# Patient Record
Sex: MALE | Race: WHITE | NOT HISPANIC OR LATINO | Employment: OTHER | URBAN - METROPOLITAN AREA
[De-identification: names, ages, dates, MRNs, and addresses within clinical notes are randomized per-mention and may not be internally consistent; named-entity substitution may affect disease eponyms.]

---

## 2017-04-28 ENCOUNTER — HOSPITAL ENCOUNTER (EMERGENCY)
Facility: HOSPITAL | Age: 52
Discharge: HOME | End: 2017-04-28
Attending: EMERGENCY MEDICINE | Admitting: EMERGENCY MEDICINE
Payer: MEDICARE

## 2017-04-28 VITALS
SYSTOLIC BLOOD PRESSURE: 165 MMHG | OXYGEN SATURATION: 95 % | RESPIRATION RATE: 16 BRPM | HEART RATE: 87 BPM | DIASTOLIC BLOOD PRESSURE: 79 MMHG | WEIGHT: 312 LBS | TEMPERATURE: 100.4 F

## 2017-04-28 DIAGNOSIS — M54.9 BACK PAIN: ICD-10-CM

## 2017-04-28 DIAGNOSIS — R53.83 FATIGUE: Primary | ICD-10-CM

## 2017-04-28 LAB
ALBUMIN SERPL BCP-MCNC: 3.3 G/DL (ref 3.5–5)
ALP SERPL-CCNC: 51 U/L (ref 46–116)
ALT SERPL W P-5'-P-CCNC: 32 U/L (ref 12–78)
ANION GAP SERPL CALCULATED.3IONS-SCNC: 9 MMOL/L (ref 4–13)
AST SERPL W P-5'-P-CCNC: 19 U/L (ref 5–45)
ATRIAL RATE: 101 BPM
BACTERIA UR QL AUTO: ABNORMAL /HPF
BASOPHILS # BLD AUTO: 0.01 THOUSANDS/ΜL (ref 0–0.1)
BASOPHILS NFR BLD AUTO: 0 % (ref 0–1)
BILIRUB SERPL-MCNC: 0.56 MG/DL (ref 0.2–1)
BILIRUB UR QL STRIP: ABNORMAL
BUN SERPL-MCNC: 15 MG/DL (ref 5–25)
CALCIUM SERPL-MCNC: 8.8 MG/DL (ref 8.3–10.1)
CHLORIDE SERPL-SCNC: 100 MMOL/L (ref 100–108)
CLARITY UR: CLEAR
CO2 SERPL-SCNC: 27 MMOL/L (ref 21–32)
COLOR UR: YELLOW
COLOR, POC: YELLOW
CREAT SERPL-MCNC: 1.23 MG/DL (ref 0.6–1.3)
EOSINOPHIL # BLD AUTO: 0.01 THOUSAND/ΜL (ref 0–0.61)
EOSINOPHIL NFR BLD AUTO: 0 % (ref 0–6)
ERYTHROCYTE [DISTWIDTH] IN BLOOD BY AUTOMATED COUNT: 12.5 % (ref 11.6–15.1)
GFR SERPL CREATININE-BSD FRML MDRD: >60 ML/MIN/1.73SQ M
GLUCOSE SERPL-MCNC: 285 MG/DL (ref 65–140)
GLUCOSE UR STRIP-MCNC: ABNORMAL MG/DL
HCT VFR BLD AUTO: 44.5 % (ref 36.5–49.3)
HGB BLD-MCNC: 15.7 G/DL (ref 12–17)
HGB UR QL STRIP.AUTO: ABNORMAL
KETONES UR STRIP-MCNC: ABNORMAL MG/DL
LEUKOCYTE ESTERASE UR QL STRIP: NEGATIVE
LYMPHOCYTES # BLD AUTO: 1.37 THOUSANDS/ΜL (ref 0.6–4.47)
LYMPHOCYTES NFR BLD AUTO: 18 % (ref 14–44)
MCH RBC QN AUTO: 33.1 PG (ref 26.8–34.3)
MCHC RBC AUTO-ENTMCNC: 35.3 G/DL (ref 31.4–37.4)
MCV RBC AUTO: 94 FL (ref 82–98)
MONOCYTES # BLD AUTO: 1.05 THOUSAND/ΜL (ref 0.17–1.22)
MONOCYTES NFR BLD AUTO: 14 % (ref 4–12)
NEUTROPHILS # BLD AUTO: 5.31 THOUSANDS/ΜL (ref 1.85–7.62)
NEUTS SEG NFR BLD AUTO: 68 % (ref 43–75)
NITRITE UR QL STRIP: NEGATIVE
NON-SQ EPI CELLS URNS QL MICRO: ABNORMAL /HPF
NRBC BLD AUTO-RTO: 0 /100 WBCS
P AXIS: 67 DEGREES
PH UR STRIP.AUTO: 5 [PH] (ref 4.5–8)
PLATELET # BLD AUTO: 192 THOUSANDS/UL (ref 149–390)
PMV BLD AUTO: 10.9 FL (ref 8.9–12.7)
POTASSIUM SERPL-SCNC: 3.9 MMOL/L (ref 3.5–5.3)
PR INTERVAL: 158 MS
PROT SERPL-MCNC: 7 G/DL (ref 6.4–8.2)
PROT UR STRIP-MCNC: >=300 MG/DL
QRS AXIS: 59 DEGREES
QRSD INTERVAL: 84 MS
QT INTERVAL: 328 MS
QTC INTERVAL: 425 MS
RBC # BLD AUTO: 4.75 MILLION/UL (ref 3.88–5.62)
RBC #/AREA URNS AUTO: ABNORMAL /HPF
SODIUM SERPL-SCNC: 136 MMOL/L (ref 136–145)
SP GR UR STRIP.AUTO: 1.01 (ref 1–1.03)
T WAVE AXIS: 63 DEGREES
UROBILINOGEN UR QL STRIP.AUTO: 0.2 E.U./DL
VENTRICULAR RATE: 101 BPM
WBC # BLD AUTO: 7.75 THOUSAND/UL (ref 4.31–10.16)
WBC #/AREA URNS AUTO: ABNORMAL /HPF

## 2017-04-28 PROCEDURE — 87086 URINE CULTURE/COLONY COUNT: CPT

## 2017-04-28 PROCEDURE — 36415 COLL VENOUS BLD VENIPUNCTURE: CPT | Performed by: EMERGENCY MEDICINE

## 2017-04-28 PROCEDURE — 81002 URINALYSIS NONAUTO W/O SCOPE: CPT | Performed by: EMERGENCY MEDICINE

## 2017-04-28 PROCEDURE — 80053 COMPREHEN METABOLIC PANEL: CPT | Performed by: EMERGENCY MEDICINE

## 2017-04-28 PROCEDURE — 99284 EMERGENCY DEPT VISIT MOD MDM: CPT

## 2017-04-28 PROCEDURE — 96374 THER/PROPH/DIAG INJ IV PUSH: CPT

## 2017-04-28 PROCEDURE — 85025 COMPLETE CBC W/AUTO DIFF WBC: CPT | Performed by: EMERGENCY MEDICINE

## 2017-04-28 PROCEDURE — 81001 URINALYSIS AUTO W/SCOPE: CPT

## 2017-04-28 PROCEDURE — 96361 HYDRATE IV INFUSION ADD-ON: CPT

## 2017-04-28 PROCEDURE — 93005 ELECTROCARDIOGRAM TRACING: CPT | Performed by: EMERGENCY MEDICINE

## 2017-04-28 RX ORDER — AMLODIPINE BESYLATE 5 MG/1
10 TABLET ORAL ONCE
Status: COMPLETED | OUTPATIENT
Start: 2017-04-28 | End: 2017-04-28

## 2017-04-28 RX ORDER — GABAPENTIN 100 MG/1
200 CAPSULE ORAL ONCE
Status: COMPLETED | OUTPATIENT
Start: 2017-04-28 | End: 2017-04-28

## 2017-04-28 RX ORDER — NAPROXEN 500 MG/1
500 TABLET ORAL 2 TIMES DAILY PRN
Qty: 14 TABLET | Refills: 0 | Status: SHIPPED | OUTPATIENT
Start: 2017-04-28 | End: 2020-02-13 | Stop reason: ALTCHOICE

## 2017-04-28 RX ORDER — GABAPENTIN 100 MG/1
200 CAPSULE ORAL 2 TIMES DAILY
COMMUNITY
End: 2020-02-13 | Stop reason: ALTCHOICE

## 2017-04-28 RX ORDER — KETOROLAC TROMETHAMINE 30 MG/ML
15 INJECTION, SOLUTION INTRAMUSCULAR; INTRAVENOUS ONCE
Status: COMPLETED | OUTPATIENT
Start: 2017-04-28 | End: 2017-04-28

## 2017-04-28 RX ORDER — FUROSEMIDE 40 MG/1
40 TABLET ORAL DAILY
COMMUNITY

## 2017-04-28 RX ORDER — BACLOFEN 10 MG/1
10 TABLET ORAL 3 TIMES DAILY PRN
Qty: 21 TABLET | Refills: 0 | Status: SHIPPED | OUTPATIENT
Start: 2017-04-28 | End: 2017-05-28

## 2017-04-28 RX ORDER — AMLODIPINE BESYLATE 10 MG/1
10 TABLET ORAL DAILY
COMMUNITY

## 2017-04-28 RX ADMIN — GABAPENTIN 200 MG: 100 CAPSULE ORAL at 11:19

## 2017-04-28 RX ADMIN — KETOROLAC TROMETHAMINE 15 MG: 30 INJECTION, SOLUTION INTRAMUSCULAR at 11:01

## 2017-04-28 RX ADMIN — SODIUM CHLORIDE 1000 ML: 0.9 INJECTION, SOLUTION INTRAVENOUS at 10:06

## 2017-04-28 RX ADMIN — AMLODIPINE BESYLATE 10 MG: 5 TABLET ORAL at 11:19

## 2017-04-29 LAB — BACTERIA UR CULT: NORMAL

## 2020-02-13 ENCOUNTER — OFFICE VISIT (OUTPATIENT)
Dept: URGENT CARE | Facility: MEDICAL CENTER | Age: 55
End: 2020-02-13
Payer: MEDICARE

## 2020-02-13 VITALS
HEART RATE: 92 BPM | DIASTOLIC BLOOD PRESSURE: 93 MMHG | RESPIRATION RATE: 20 BRPM | SYSTOLIC BLOOD PRESSURE: 171 MMHG | BODY MASS INDEX: 37.11 KG/M2 | OXYGEN SATURATION: 98 % | TEMPERATURE: 97.8 F | WEIGHT: 280 LBS | HEIGHT: 73 IN

## 2020-02-13 DIAGNOSIS — M25.561 CHRONIC PAIN OF RIGHT KNEE: Primary | ICD-10-CM

## 2020-02-13 DIAGNOSIS — G89.29 CHRONIC PAIN OF RIGHT KNEE: Primary | ICD-10-CM

## 2020-02-13 PROCEDURE — G0463 HOSPITAL OUTPT CLINIC VISIT: HCPCS | Performed by: PHYSICIAN ASSISTANT

## 2020-02-13 PROCEDURE — 99213 OFFICE O/P EST LOW 20 MIN: CPT | Performed by: PHYSICIAN ASSISTANT

## 2020-02-13 RX ORDER — ATORVASTATIN CALCIUM 40 MG/1
40 TABLET, FILM COATED ORAL DAILY
COMMUNITY
Start: 2019-05-06

## 2020-02-13 RX ORDER — INSULIN ASPART 100 [IU]/ML
INJECTION, SOLUTION INTRAVENOUS; SUBCUTANEOUS
COMMUNITY
Start: 2019-05-16

## 2020-02-13 RX ORDER — CYCLOBENZAPRINE HCL 5 MG
5-10 TABLET ORAL 2 TIMES DAILY PRN
COMMUNITY
Start: 2019-12-04

## 2020-02-13 RX ORDER — IBUPROFEN 800 MG/1
800 TABLET ORAL EVERY 8 HOURS PRN
COMMUNITY
Start: 2019-12-19 | End: 2020-12-18

## 2020-02-13 RX ORDER — CLONAZEPAM 1 MG/1
1 TABLET ORAL DAILY PRN
COMMUNITY
Start: 2019-12-13

## 2020-02-13 RX ORDER — CANDESARTAN 16 MG/1
16 TABLET ORAL DAILY
COMMUNITY
Start: 2020-02-06

## 2020-02-13 RX ORDER — FENOFIBRATE 134 MG/1
134 CAPSULE ORAL
COMMUNITY
Start: 2019-05-06

## 2020-02-13 RX ORDER — LEVOTHYROXINE SODIUM 0.05 MG/1
50 TABLET ORAL DAILY
COMMUNITY
Start: 2019-05-06
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
## 2020-02-13 NOTE — PATIENT INSTRUCTIONS
Use knee brace as needed for comfort and support   follow-up with Orthopedics   follow-up with physical therapy  Follow up with PCP in 3-5 days  Proceed to  ER if symptoms worsen  Knee Pain   WHAT YOU NEED TO KNOW:   Knee pain may start suddenly, or it may be a long-term problem  You may have pain on the side, front, or back of your knee  You may have knee stiffness and swelling  You may hear popping sounds or feel like your knee is giving way or locking up as you walk  You may feel pain when you sit, stand, walk, or climb up and down stairs  Knee pain can be caused by conditions such as obesity, inflammation, or strains or tears in ligaments or tendons  DISCHARGE INSTRUCTIONS:   Follow up with your healthcare provider within 24 hours or as directed: You may need follow-up treatments, such as steroid injections to decrease pain  Write down your questions so you remember to ask them during your visits  Self-care:   · Rest  your knee so it can heal  Limit activities that increase your pain  · Ice  can help reduce swelling  Wrap ice in a towel and put it on your knee for as long and as often as directed  · Compression  with a brace or bandage can help reduce swelling  Use a brace or bandage only as directed  · Elevation  helps decrease pain and swelling  Elevate your knee while you are sitting or lying down  Prop your leg on pillows to keep your knee above the level of your heart  Medicines:   · NSAIDs  help decrease swelling and pain or fever  This medicine is available with or without a doctor's order  NSAIDs can cause stomach bleeding or kidney problems in certain people  If you take blood thinner medicine, always ask your healthcare provider if NSAIDs are safe for you  Always read the medicine label and follow directions  · Acetaminophen  decreases pain and fever  It is available without a doctor's order  Ask how much to take and when to take it  Follow directions   Acetaminophen can cause liver damage if not taken correctly  · Take your medicine as directed  Contact your healthcare provider if you think your medicine is not helping or if you have side effects  Tell him or her if you are allergic to any medicine  Keep a list of the medicines, vitamins, and herbs you take  Include the amounts, and when and why you take them  Bring the list or the pill bottles to follow-up visits  Carry your medicine list with you in case of an emergency  Exercise as directed: You may need to see a physical therapist or do recommended exercises to improve movement and decrease your pain  You may be directed to walk, swim, or ride a bike  Follow your exercise plan exactly as directed to avoid further injury  Contact your healthcare provider if:   · You have questions or concerns about your condition or care  Return to the emergency department if:   · Your pain is worse, even after treatment  · You cannot bend or straighten your leg completely  · The swelling around your knee does not go down even with treatment  · Your knee is painful and hot to the touch  © 2017 [employer]  Information is for End User's use only and may not be sold, redistributed or otherwise used for commercial purposes  All illustrations and images included in CareNotes® are the copyrighted property of [employer]  or [employer]  The above information is an  only  It is not intended as medical advice for individual conditions or treatments  Talk to your doctor, nurse or pharmacist before following any medical regimen to see if it is safe and effective for you
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
## 2024-07-23 NOTE — PROGRESS NOTES
NEPHROLOGY OUTPATIENT CONSULTATION   Jim Marcos Jr. 59 y.o. male MRN: [id number]  Date: 7/24/2024  Reason for consultation:   Chief Complaint   Patient presents with    Consult    Proteinuria       ASSESSMENT AND PLAN:  Persistent Proteinuria Chronic kidney disease stage II  -Onset: At least since 2018  -Proteinuria gradually worsening last albumin creatinine ratio from July 18, 2024 showed history of 1.2 g from previous level of 337 mg.  Renal function is stable baseline creatinine 0.9 to 1.2 mg/dL last blood work showed creatinine 1.12 mg/dL with EGFR of 76 making it chronic kidney disease stage II  -Proteinuria Likely due to diabetic nephropathy, due to HTN and due to smoking related glomerulosclerosis.   -office UA positive for trace protein and blood. Urine micro: no RBC   -Last A1c was 7.3 but previously peak proteinuria was 11.9% in April 2022  -on treatment with candesartan to help with proteinuria.  Not able to use SGLT2 inhibitors as previously he had recurrent UTI while he was on Farxiga.  Not able to increase the dose of candesartan at this time as blood pressure is low but may consider increasing to 32 mg daily which is the maximum dose in future if possible.  Another option would be to add spironolactone in future  -stressed on quitting smoking and avoid NSAIDs.  Stressed on daily exercise and weight loss  -Stressed on Goal A1c <7.0 and Goal BP <130/80 mmHg  -Continue to monitor.  Follow-up in 4 months with repeat labs    Primary Hypertension:   -Current medication: Amlodipine 10 mg, candesartan 16 mg daily, metoprolol 25 mg twice daily     -Current blood pressure: low  -Plan:     Decrease amlodipine to 5 mg daily and monitor BP.  Continue same dose of candesartan as he has proteinuria and continue same dose of metoprolol as he has A-fib  -Recommend 2 g sodium diet.    -Recommend daily exercise and weight loss  -Discussed home monitoring of BP and maintaining a log of blood pressure.   -Recommend goal BP less than 130/80.     Vitamin D deficiency  -Vitamin D level 21 in July 2024  -continue vitamin D 1000 units daily    Left lower extremity edema, on Lasix as needed, also on amlodipine which could be contributing to edema.  Decrease the dose of amlodipine today which may help.  Recommend limb elevation and low-sodium diet    Hypernatremia  -recommend increasing fluid intake. Monitor Sodium level was 146     Diabetes Mellitus Type 2 with CKD stage 2:  -recommend goal A1c less than 7.0 if possible  -stressed on diabetic diet and daily exercise as well as weight loss  -continue monitoring of A1c per PCP, management per PCP and follow-up with PCP  -discussed about risk of CKD progression if diabetes is not controlled well.  -currently on: Tresiba  and NovoLog.  Reviewed endocrine note from [facility]  -Last A1c:  7.3     Hyperlipidemia: On Lofibra. Not taking statin-recommend discussion with PCP and monitoring per PCP    Current smoker- 1-2 /day, stressed on quitting smoking     Obesity:  -BMI: Body mass index is 34.9 kg/m².  -Stressed on daily exercise, reduced calorie diet and weight loss.  Discussed about need for lifestyle modification.  Recommend 150 minutes of exercise per week such as brisk walking.      Diagnoses and all orders for this visit:    Persistent proteinuria, unspecified  -     POCT urine dip  -     Basic metabolic panel; Future  -     Protein / creatinine ratio, urine; Future  -     Urinalysis with microscopic; Future  -     Albumin / creatinine urine ratio; Future    Benign hypertension  -     Basic metabolic panel; Future    Chronic kidney disease, stage II (mild)  -     Basic metabolic panel; Future  -     Protein / creatinine ratio, urine; Future  -     Urinalysis with microscopic; Future  -     Albumin / creatinine urine ratio; Future    Type 2 diabetes mellitus with proteinuria  (HCC)  -     Basic metabolic panel; Future  -     Protein / creatinine ratio, urine; Future  -      "Urinalysis with microscopic; Future  -     Albumin / creatinine urine ratio; Future    Vitamin D deficiency    Hypernatremia  -     Basic metabolic panel; Future    Obesity (BMI 30.0-34.9)    Other orders  -     Multiple Vitamin (MULTIVITAMIN ADULT PO); Take by mouth daily  -     Vitamin D, Cholecalciferol, 25 MCG (1000 UT) TABS; Take by mouth  -     apixaban (ELIQUIS) 5 mg; Take 5 mg by mouth 2 (two) times a day        Patient Instructions   Blood pressure is low. Decrease amlodipine to 5 mg daily    -Recommend 2 g sodium diet.    -Recommend daily exercise and weight loss  -Discussed home monitoring of BP and maintaining a log of blood pressure.  -Recommend goal BP less than 130/80. Please inform me if SBP below 110 or above 140's persistently.     Renal function stable     Avoid NSAIDs    Increase oral water intake    Follow up: 4 months with repeat Lab work within a week of the scheduled office visit. Will discuss the results of the previsit Labs during the office visit.      Patient Education     Chronic kidney disease   The Basics   Written by the doctors and editors at [facility]   What is chronic kidney disease? -- Chronic kidney disease, or \"CKD,\" is when the kidneys stop working as well as they should. When they are working normally, the kidneys filter blood and remove waste and excess salt and water (figure 1).  In people with CKD, the kidneys slowly lose the ability to filter blood. In time, the kidneys can stop working completely. That is why it is so important to keep CKD from getting worse.  What are the symptoms of CKD? -- At first, CKD causes no symptoms. As the disease gets worse, it can:   Make your feet, ankles, or legs swell (called \"edema\")   Give you high blood pressure   Make you very tired   Damage your bones  Will I need tests? -- Yes. Your doctor will want to see you regularly. You will probably have appointments at least once a year, and you will get regular tests to check your kidneys. " "These include blood and urine tests.  If your CKD gets worse over time, you will probably need to see a \"nephrologist.\" This is a doctor who takes care of people with kidney disease.  Is there anything I can do to keep my kidneys from getting worse? -- Yes. If you have CKD, you can protect your kidneys if you:   Take all of your prescribed medicines every day, and follow all of your doctor's instructions for how to take them.   Keep your blood sugar in a healthy range, if you have diabetes.   Change your diet, if your doctor or nurse recommends to. They might suggest working with a dietitian (nutrition expert).   Quit smoking, if you smoke.   Lose weight, if you have excess body weight.   Avoid medicines that can harm the kidneys - One example is nonsteroidal antiinflammatory drugs (\"NSAIDs\"). These medicines include ibuprofen (sample brand names: Advil, Motrin) and naproxen (sample brand name: Aleve). There are other medicines that people with CKD need to avoid, too. Check with your doctor, nurse, or kidney specialist before starting any new medicines or supplements, even those you can buy without a prescription.  How is CKD treated? -- People in the early stages of CKD can take medicines to keep the disease from getting worse. For example, many people with CKD should take medicines known as \"ACE inhibitors\" or \"angiotensin receptor blockers.\" If your doctor or nurse prescribes these medicines, it is very important that you take them every day as directed. If they cause side effects or cost too much, tell your doctor or nurse. They might have solutions to offer.  What happens if my kidneys stop working completely? -- If your kidneys can no longer filter blood properly, you can choose between 3 different treatments to take over their job. Your choices are:   Kidney transplant - After transplant surgery, the new kidney can do the job of your own kidneys. If you have a kidney transplant, you will need to take " medicines for the rest of your life to keep your body from reacting badly to the new kidney. (You only need 1 kidney to live.)   Hemodialysis - This is a procedure in which a dialysis machine takes over the job of the kidneys. The machine pumps blood out of the body, filters it, and returns it to the body. If you choose hemodialysis, you will need to be hooked up to the machine at least 3 times a week for several hours for the rest of your life. Before you start, you will also need to have surgery to prepare a blood vessel for attachment to the machine.   Peritoneal dialysis - This involves piping a special fluid into the belly every day. If you choose peritoneal dialysis, you will need surgery to have a tube implanted in your belly. Then, you will have to learn how to pipe the fluid in and out through that tube.  How do I choose between the different treatment options? -- You and your doctor will need to work together to find a treatment that's right for you. Kidney transplant surgery is usually the best option for most people. But often, there are no kidneys available for transplant.  Ask your doctor to explain all of your options and how they might work for you. Then, talk openly with them about how you feel about all of the options. You might even decide that you do not want any treatment. That is your choice.  All topics are updated as new evidence becomes available and our peer review process is complete.  This topic retrieved from [employer] on: May 18, 2024.  Topic 06254 Version 34.0  Release: 32.4.3 - C32.137  © 2024 UpToDate, Inc. and/or its affiliates. All rights reserved.  figure 1: Anatomy of the urinary tract     Urine is made by the kidneys. It passes from the kidneys into the bladder through 2 tubes called the ureters. Then, it leaves the bladder through another tube called the urethra.  Graphic 87528 Version 8.0  Consumer Information Use and Disclaimer   Disclaimer: This generalized information is a  limited summary of diagnosis, treatment, and/or medication information. It is not meant to be comprehensive and should be used as a tool to help the user understand and/or assess potential diagnostic and treatment options. It does NOT include all information about conditions, treatments, medications, side effects, or risks that may apply to a specific patient. It is not intended to be medical advice or a substitute for the medical advice, diagnosis, or treatment of a health care provider based on the health care provider's examination and assessment of a patient's specific and unique circumstances. Patients must speak with a health care provider for complete information about their health, medical questions, and treatment options, including any risks or benefits regarding use of medications. This information does not endorse any treatments or medications as safe, effective, or approved for treating a specific patient. UpToDate, Inc. and its affiliates disclaim any warranty or liability relating to this information or the use thereof.The use of this information is governed by the Terms of Use, available at https://www.Cart[employer].com/en/know/clinical-effectiveness-terms. 2024© UpToDate, Inc. and its affiliates and/or licensors. All rights reserved.  Copyright   © 2024 Up[employer]Date, Inc. and/or its affiliates. All rights reserved.      HISTORY OF PRESENT ILLNESS:  Jim Marcos Jr. is a 59 y.o. year old male with medical issues of diabetes mellitus type 2 since 1995, proliferative retinopathy, HTN ,  paroxysmal A-fib, hypothyroidism, hyperlipidemia who presents for initial consultation for proteinuria    -Patient was hospitalized from June 14 to  June 16 for hypoglycemia incident.  During the hospital discharge he was on metoprolol 25 mg twice daily and he was started on Eliquis as well.  Underwent cardioversion during the hospital stay as per the review of PCP note    -> Patient was seen by endocrine at [facility] on  July 17, 2024.  Was referred for finding of worsening proteinuria on urine test from July 2024.  Albumin creatinine ratio worsened to 1.2 g.  Blood work was reviewed, baseline creatinine appears to be 0.9 to 1.2 mg/dL dating back to 2021.  Renal function was stable last blood work from July 2024 showed creatinine 1.12 mg/dL    - was taken off Farxiga due to recurrent UTI. Took for couple months     Has intermittent left lower extremity edema but denies any pain.  He is on Lasix as needed but has not required recently.  Denies any urinary symptoms, denies any intake of NSAIDs, takes Tylenol if needed    REVIEW OF SYSTEMS:    Review of Systems   Constitutional:  Negative for activity change, appetite change, chills, diaphoresis, fatigue and fever.   HENT:  Negative for congestion, ear pain, facial swelling, nosebleeds and sore throat.    Eyes:  Negative for pain and visual disturbance.   Respiratory:  Negative for cough, chest tightness and shortness of breath.    Cardiovascular:  Negative for chest pain and palpitations.   Gastrointestinal:  Negative for abdominal distention, abdominal pain, diarrhea, nausea and vomiting.   Genitourinary:  Negative for difficulty urinating, dysuria, flank pain, frequency and hematuria.   Musculoskeletal:  Negative for arthralgias, back pain and joint swelling.   Skin:  Negative for color change and rash.   Neurological:  Positive for dizziness. Negative for seizures, syncope, weakness and headaches.   Psychiatric/Behavioral:  Negative for agitation and confusion. The patient is not nervous/anxious.    All other systems reviewed and are negative.      More than 10 point review of systems were obtained and discussed in length with the patient.     PAST MEDICAL HISTORY:  Past Medical History:   Diagnosis Date    Atrial fibrillation (HCC)     Depression     Diabetes mellitus (HCC)     Hypercholesteremia     Hypertension     Hypothyroidism        PAST SURGICAL HISTORY:  Past Surgical  History:   Procedure Laterality Date    EYE SURGERY         ALLERGIES:  Allergies   Allergen Reactions    Nitrofurantoin Shortness Of Breath     Does not remember      Sulfa Antibiotics        SOCIAL HISTORY:  Social History     Substance and Sexual Activity   Alcohol Use No     Social History     Substance and Sexual Activity   Drug Use No     Social History     Tobacco Use   Smoking Status Every Day    Current packs/day: 0.00    Types: Cigarettes    Last attempt to quit: 2017    Years since quittin.2   Smokeless Tobacco Never       FAMILY HISTORY:  Family History   Problem Relation Age of Onset    Diabetes Mother     COPD Mother     Heart disease Father     Diabetes Maternal Uncle        MEDICATIONS:    Current Outpatient Medications:     amLODIPine (NORVASC) 10 mg tablet, Take 10 mg by mouth daily, Disp: , Rfl:     apixaban (ELIQUIS) 5 mg, Take 5 mg by mouth 2 (two) times a day, Disp: , Rfl:     candesartan (ATACAND) 16 mg tablet, Take 16 mg by mouth daily, Disp: , Rfl:     clonazePAM (KlonoPIN) 1 mg tablet, Take 1 mg by mouth daily as needed, Disp: , Rfl:     fenofibrate micronized (LOFIBRA) 134 MG capsule, Take 134 mg by mouth, Disp: , Rfl:     furosemide (LASIX) 40 mg tablet, Take 40 mg by mouth daily as needed, Disp: , Rfl:     Insulin Aspart FlexPen (NOVOLOG FLEXPEN) 100 UNIT/ML SOPN, Inject 3x daily with meals. 1 unit for every 5 grams of CHO. TDD 80 units. E11.65, Disp: , Rfl:     insulin detemir (LEVEMIR FLEXTOUCH) 100 Units/mL injection pen, Inject 40 Units under the skin 2 (two) times a day, Disp: , Rfl:     levothyroxine 50 mcg tablet, Take 50 mcg by mouth daily, Disp: , Rfl:     metoprolol tartrate (LOPRESSOR) 25 mg tablet, Take 25 mg by mouth 2 (two) times a day, Disp: , Rfl:     Multiple Vitamin (MULTIVITAMIN ADULT PO), Take by mouth daily, Disp: , Rfl:     Vitamin D, Cholecalciferol, 25 MCG (1000 UT) TABS, Take by mouth, Disp: , Rfl:     atorvastatin (LIPITOR) 40 mg tablet, Take 40 mg by  "mouth daily (Patient not taking: Reported on 7/24/2024), Disp: , Rfl:     baclofen 10 mg tablet, Take 1 tablet by mouth 3 (three) times a day as needed for muscle spasms for up to 30 days (Patient not taking: Reported on 7/24/2024), Disp: 21 tablet, Rfl: 0    ibuprofen (MOTRIN) 800 mg tablet, Take 800 mg by mouth every 8 (eight) hours as needed (Patient not taking: Reported on 7/24/2024), Disp: , Rfl:       PHYSICAL EXAM:  Vitals:    07/24/24 1117 07/24/24 1222   BP:  96/70   Weight: 123 kg (271 lb 12.8 oz)    Height: 6' 2\" (1.88 m)      Body mass index is 34.9 kg/m².  Wt Readings from Last 3 Encounters:   07/24/24 123 kg (271 lb 12.8 oz)   02/13/20 127 kg (280 lb)   04/28/17 (!) 142 kg (312 lb)     Physical Exam  Constitutional:       General: He is not in acute distress.     Appearance: He is well-developed. He is not diaphoretic.   HENT:      Head: Normocephalic and atraumatic.      Mouth/Throat:      Mouth: Mucous membranes are moist.   Eyes:      General: No scleral icterus.     Conjunctiva/sclera: Conjunctivae normal.      Pupils: Pupils are equal, round, and reactive to light.   Neck:      Thyroid: No thyromegaly.   Cardiovascular:      Rate and Rhythm: Normal rate and regular rhythm.      Heart sounds: Normal heart sounds. No murmur heard.     No friction rub.   Pulmonary:      Effort: Pulmonary effort is normal. No respiratory distress.      Breath sounds: Normal breath sounds. No wheezing or rales.   Abdominal:      General: Bowel sounds are normal. There is no distension.      Palpations: Abdomen is soft.      Tenderness: There is no abdominal tenderness.   Musculoskeletal:         General: No deformity.      Cervical back: Neck supple.      Right lower leg: No edema.      Left lower leg: Edema (trace edema) present.   Lymphadenopathy:      Cervical: No cervical adenopathy.   Skin:     General: Skin is dry.      Coloration: Skin is not jaundiced or pale.      Nails: There is no clubbing.   Neurological: " "     Mental Status: He is alert and oriented to person, place, and time. He is not disoriented.   Psychiatric:         Mood and Affect: Mood normal. Mood is not anxious. Affect is not inappropriate.         Behavior: Behavior normal.         Thought Content: Thought content normal.           Lab Results:   Results for orders placed or performed in visit on 07/24/24   POCT urine dip   Result Value Ref Range    LEUKOCYTE ESTERASE,UA -     NITRITE,UA -     SL AMB POCT UROBILINOGEN 0.2     POCT URINE PROTEIN 30      PH,UA 5.0     BLOOD,UA 50     SPECIFIC GRAVITY,UA 1.015     KETONES,UA -     BILIRUBIN,UA -     GLUCOSE, UA -      COLOR,UA Yellow     CLARITY,UA Clear        Results from last 7 days   Lab Units 07/18/24  0948   SODIUM mmol/L 146*   POTASSIUM mmol/L 4.6   CHLORIDE mmol/L 108   CO2 mmol/L 30   BUN mg/dL 36*   CREATININE mg/dL 1.12   CALCIUM mg/dL 9.3   XMAGNESIUM mg/dL 1.9       Portions of the record may have been created with voice recognition software. Occasional wrong word or \"sound a like\" substitutions may have occurred due to the inherent limitations of voice recognition software. Read the chart carefully and recognize, using context, where substitutions have occurred.    "

## 2024-07-24 ENCOUNTER — OFFICE VISIT (OUTPATIENT)
Dept: NEPHROLOGY | Facility: CLINIC | Age: 59
End: 2024-07-24
Payer: MEDICARE

## 2024-07-24 VITALS
BODY MASS INDEX: 34.88 KG/M2 | DIASTOLIC BLOOD PRESSURE: 70 MMHG | WEIGHT: 271.8 LBS | SYSTOLIC BLOOD PRESSURE: 96 MMHG | HEIGHT: 74 IN

## 2024-07-24 DIAGNOSIS — R80.1 PERSISTENT PROTEINURIA, UNSPECIFIED: Primary | ICD-10-CM

## 2024-07-24 DIAGNOSIS — E87.0 HYPERNATREMIA: ICD-10-CM

## 2024-07-24 DIAGNOSIS — I10 BENIGN HYPERTENSION: ICD-10-CM

## 2024-07-24 DIAGNOSIS — E55.9 VITAMIN D DEFICIENCY: ICD-10-CM

## 2024-07-24 DIAGNOSIS — R80.9 TYPE 2 DIABETES MELLITUS WITH PROTEINURIA  (HCC): ICD-10-CM

## 2024-07-24 DIAGNOSIS — N18.2 CHRONIC KIDNEY DISEASE, STAGE II (MILD): ICD-10-CM

## 2024-07-24 DIAGNOSIS — E11.29 TYPE 2 DIABETES MELLITUS WITH PROTEINURIA  (HCC): ICD-10-CM

## 2024-07-24 DIAGNOSIS — E66.9 OBESITY (BMI 30.0-34.9): ICD-10-CM

## 2024-07-24 PROBLEM — E66.01 MORBID OBESITY (HCC): Status: ACTIVE | Noted: 2024-07-24

## 2024-07-24 LAB
SL AMB  POCT GLUCOSE, UA: ABNORMAL
SL AMB LEUKOCYTE ESTERASE,UA: ABNORMAL
SL AMB POCT BILIRUBIN,UA: ABNORMAL
SL AMB POCT BLOOD,UA: 50
SL AMB POCT CLARITY,UA: CLEAR
SL AMB POCT COLOR,UA: YELLOW
SL AMB POCT KETONES,UA: ABNORMAL
SL AMB POCT NITRITE,UA: ABNORMAL
SL AMB POCT PH,UA: 5
SL AMB POCT SPECIFIC GRAVITY,UA: 1.01
SL AMB POCT URINE PROTEIN: 30
SL AMB POCT UROBILINOGEN: 0.2

## 2024-07-24 PROCEDURE — 99204 OFFICE O/P NEW MOD 45 MIN: CPT | Performed by: INTERNAL MEDICINE

## 2024-07-24 PROCEDURE — 81002 URINALYSIS NONAUTO W/O SCOPE: CPT | Performed by: INTERNAL MEDICINE

## 2024-07-24 RX ORDER — MULTIVIT-MIN/IRON/FOLIC ACID/K 18-600-40
CAPSULE ORAL
COMMUNITY

## 2024-07-24 NOTE — PATIENT INSTRUCTIONS
"Blood pressure is low. Decrease amlodipine to 5 mg daily    -Recommend 2 g sodium diet.    -Recommend daily exercise and weight loss  -Discussed home monitoring of BP and maintaining a log of blood pressure.  -Recommend goal BP less than 130/80. Please inform me if SBP below 110 or above 140's persistently.     Renal function stable     Avoid NSAIDs    Increase oral water intake    Follow up: 4 months with repeat Lab work within a week of the scheduled office visit. Will discuss the results of the previsit Labs during the office visit.      Patient Education     Chronic kidney disease   The Basics   Written by the doctors and editors at [facility]   What is chronic kidney disease? -- Chronic kidney disease, or \"CKD,\" is when the kidneys stop working as well as they should. When they are working normally, the kidneys filter blood and remove waste and excess salt and water (figure 1).  In people with CKD, the kidneys slowly lose the ability to filter blood. In time, the kidneys can stop working completely. That is why it is so important to keep CKD from getting worse.  What are the symptoms of CKD? -- At first, CKD causes no symptoms. As the disease gets worse, it can:   Make your feet, ankles, or legs swell (called \"edema\")   Give you high blood pressure   Make you very tired   Damage your bones  Will I need tests? -- Yes. Your doctor will want to see you regularly. You will probably have appointments at least once a year, and you will get regular tests to check your kidneys. These include blood and urine tests.  If your CKD gets worse over time, you will probably need to see a \"nephrologist.\" This is a doctor who takes care of people with kidney disease.  Is there anything I can do to keep my kidneys from getting worse? -- Yes. If you have CKD, you can protect your kidneys if you:   Take all of your prescribed medicines every day, and follow all of your doctor's instructions for how to take them.   Keep your blood " "sugar in a healthy range, if you have diabetes.   Change your diet, if your doctor or nurse recommends to. They might suggest working with a dietitian (nutrition expert).   Quit smoking, if you smoke.   Lose weight, if you have excess body weight.   Avoid medicines that can harm the kidneys - One example is nonsteroidal antiinflammatory drugs (\"NSAIDs\"). These medicines include ibuprofen (sample brand names: Advil, Motrin) and naproxen (sample brand name: Aleve). There are other medicines that people with CKD need to avoid, too. Check with your doctor, nurse, or kidney specialist before starting any new medicines or supplements, even those you can buy without a prescription.  How is CKD treated? -- People in the early stages of CKD can take medicines to keep the disease from getting worse. For example, many people with CKD should take medicines known as \"ACE inhibitors\" or \"angiotensin receptor blockers.\" If your doctor or nurse prescribes these medicines, it is very important that you take them every day as directed. If they cause side effects or cost too much, tell your doctor or nurse. They might have solutions to offer.  What happens if my kidneys stop working completely? -- If your kidneys can no longer filter blood properly, you can choose between 3 different treatments to take over their job. Your choices are:   Kidney transplant - After transplant surgery, the new kidney can do the job of your own kidneys. If you have a kidney transplant, you will need to take medicines for the rest of your life to keep your body from reacting badly to the new kidney. (You only need 1 kidney to live.)   Hemodialysis - This is a procedure in which a dialysis machine takes over the job of the kidneys. The machine pumps blood out of the body, filters it, and returns it to the body. If you choose hemodialysis, you will need to be hooked up to the machine at least 3 times a week for several hours for the rest of your life. Before " you start, you will also need to have surgery to prepare a blood vessel for attachment to the machine.   Peritoneal dialysis - This involves piping a special fluid into the belly every day. If you choose peritoneal dialysis, you will need surgery to have a tube implanted in your belly. Then, you will have to learn how to pipe the fluid in and out through that tube.  How do I choose between the different treatment options? -- You and your doctor will need to work together to find a treatment that's right for you. Kidney transplant surgery is usually the best option for most people. But often, there are no kidneys available for transplant.  Ask your doctor to explain all of your options and how they might work for you. Then, talk openly with them about how you feel about all of the options. You might even decide that you do not want any treatment. That is your choice.  All topics are updated as new evidence becomes available and our peer review process is complete.  This topic retrieved from [employer] on: May 18, 2024.  Topic 97002 Version 34.0  Release: 32.4.3 - C32.137  © 2024 UpToDate, Inc. and/or its affiliates. All rights reserved.  figure 1: Anatomy of the urinary tract     Urine is made by the kidneys. It passes from the kidneys into the bladder through 2 tubes called the ureters. Then, it leaves the bladder through another tube called the urethra.  Graphic 09821 Version 8.0  Consumer Information Use and Disclaimer   Disclaimer: This generalized information is a limited summary of diagnosis, treatment, and/or medication information. It is not meant to be comprehensive and should be used as a tool to help the user understand and/or assess potential diagnostic and treatment options. It does NOT include all information about conditions, treatments, medications, side effects, or risks that may apply to a specific patient. It is not intended to be medical advice or a substitute for the medical advice, diagnosis, or  treatment of a health care provider based on the health care provider's examination and assessment of a patient's specific and unique circumstances. Patients must speak with a health care provider for complete information about their health, medical questions, and treatment options, including any risks or benefits regarding use of medications. This information does not endorse any treatments or medications as safe, effective, or approved for treating a specific patient. UpToDate, Inc. and its affiliates disclaim any warranty or liability relating to this information or the use thereof.The use of this information is governed by the Terms of Use, available at https://www.JETMEuwer.com/en/know/clinical-effectiveness-terms. 2024© UpToDate, Inc. and its affiliates and/or licensors. All rights reserved.  Copyright   © 2024 UpToDate, Inc. and/or its affiliates. All rights reserved.

## 2024-12-04 ENCOUNTER — TELEPHONE (OUTPATIENT)
Dept: NEPHROLOGY | Facility: CLINIC | Age: 59
End: 2024-12-04

## 2024-12-06 ENCOUNTER — TELEPHONE (OUTPATIENT)
Dept: NEPHROLOGY | Facility: CLINIC | Age: 59
End: 2024-12-06

## 2024-12-09 ENCOUNTER — TELEPHONE (OUTPATIENT)
Age: 59
End: 2024-12-09

## 2024-12-09 NOTE — TELEPHONE ENCOUNTER
Patient called in stating he wanted his labs sent to [facility] lab in [place].    Sent labs to [facility] [place]  Fax [phone]

## 2024-12-12 NOTE — TELEPHONE ENCOUNTER
Pt cancelled 12/12/24 appt with Dr. De Guzman via [facility] to 7/30/25. Called to reschedule for sooner appt - 1/2/25. Pt also stated he got labs done at [facility] on [address]. Please notify pt if there is any concern.

## 2024-12-13 NOTE — TELEPHONE ENCOUNTER
Patient said he was at [facility] lab.  No labs on file.    Resent to fax the lab provided.    [facility] [phone]

## 2024-12-23 ENCOUNTER — TELEPHONE (OUTPATIENT)
Dept: NEPHROLOGY | Facility: CLINIC | Age: 59
End: 2024-12-23

## 2025-01-14 ENCOUNTER — TELEPHONE (OUTPATIENT)
Age: 60
End: 2025-01-14

## 2025-01-14 NOTE — TELEPHONE ENCOUNTER
Patient requesting lab orders be faxed to [facility]. Patient did not have fax number. Patient also asking if he can have an appt sooner than August with PA or NP. Thank you.

## 2025-01-15 NOTE — TELEPHONE ENCOUNTER
Called pt scheduled him with Debi 3/18 on available slot. Pt okay with this appt.     -  schedule full until August.

## 2025-01-31 DIAGNOSIS — N18.2 CHRONIC KIDNEY DISEASE, STAGE II (MILD): ICD-10-CM

## 2025-01-31 DIAGNOSIS — R80.1 PERSISTENT PROTEINURIA, UNSPECIFIED: Primary | ICD-10-CM

## 2025-02-04 DIAGNOSIS — R60.0 PERIPHERAL EDEMA: Primary | ICD-10-CM

## 2025-02-04 RX ORDER — FUROSEMIDE 20 MG/1
20 TABLET ORAL DAILY PRN
Qty: 90 TABLET | Refills: 3 | Status: SHIPPED | OUTPATIENT
Start: 2025-02-04

## 2025-03-10 ENCOUNTER — TELEPHONE (OUTPATIENT)
Dept: NEPHROLOGY | Facility: CLINIC | Age: 60
End: 2025-03-10

## 2025-03-14 PROBLEM — R60.0 LOCALIZED EDEMA: Status: ACTIVE | Noted: 2025-03-14

## 2025-03-17 LAB
ALBUMIN SERPL-MCNC: 3.8 G/DL (ref 3.5–5.7)
ALP SERPL-CCNC: 56 U/L (ref 35–120)
ALT SERPL-CCNC: 12 U/L
ANION GAP SERPL CALCULATED.3IONS-SCNC: 6 MMOL/L (ref 3–11)
AST SERPL-CCNC: 14 U/L
BILIRUB SERPL-MCNC: 0.4 MG/DL (ref 0.2–1)
BUN SERPL-MCNC: 22 MG/DL (ref 7–28)
CALCIUM SERPL-MCNC: 9.4 MG/DL (ref 8.5–10.5)
CHLORIDE SERPL-SCNC: 104 MMOL/L (ref 100–109)
CO2 SERPL-SCNC: 35 MMOL/L (ref 21–31)
CREAT SERPL-MCNC: 1.09 MG/DL (ref 0.53–1.3)
CREAT UR-MCNC: 105.1 MG/DL (ref 50–200)
CYTOLOGY CMNT CVX/VAG CYTO-IMP: ABNORMAL
EST. AVERAGE GLUCOSE BLD GHB EST-MCNC: 166 MG/DL
GFR/BSA.PRED SERPLBLD CYS-BASED-ARV: 79 ML/MIN/{1.73_M2}
GLUCOSE SERPL-MCNC: 135 MG/DL (ref 65–99)
HBA1C MFR BLD: 7.4 %
POTASSIUM SERPL-SCNC: 4.5 MMOL/L (ref 3.5–5.2)
PROT SERPL-MCNC: 6.4 G/DL (ref 6.3–8.3)
PROT UR-MCNC: 210.6 MG/DL
PROT/CREAT UR: 2
SODIUM SERPL-SCNC: 145 MMOL/L (ref 135–145)

## 2025-03-18 ENCOUNTER — RESULTS FOLLOW-UP (OUTPATIENT)
Dept: OTHER | Facility: HOSPITAL | Age: 60
End: 2025-03-18

## 2025-03-18 ENCOUNTER — TELEPHONE (OUTPATIENT)
Age: 60
End: 2025-03-18

## 2025-03-18 DIAGNOSIS — R60.0 PERIPHERAL EDEMA: Primary | ICD-10-CM

## 2025-03-18 DIAGNOSIS — N18.2 CHRONIC KIDNEY DISEASE, STAGE II (MILD): ICD-10-CM

## 2025-03-18 LAB
ALBUMIN/CREAT UR: 1473.8
BACTERIA URNS QL MICRO: ABNORMAL
CREAT UR-MCNC: 104.9 MG/DL (ref 50–200)
GLUCOSE UR QL STRIP: ABNORMAL MG/DL
HGB UR QL STRIP: ABNORMAL MG/DL
KETONES UR QL STRIP: NEGATIVE MG/DL
LEUKOCYTE ESTERASE UR QL STRIP: NEGATIVE /UL
MICROALBUMIN UR-MCNC: 154.6 MG/DL
NITRITE UR QL STRIP: NEGATIVE
PH UR: 6 [PH] (ref 4.5–8)
PROT 24H UR-MRATE: ABNORMAL MG/DL
RBC #/AREA URNS HPF: ABNORMAL /HPF (ref 0–2)
SL AMB POCT URINE COMMENT: ABNORMAL
SP GR UR: 1.02 (ref 1–1.03)
SQUAMOUS #/AREA URNS HPF: >10 /LPF (ref 0–5)
WBC #/AREA URNS HPF: ABNORMAL /HPF (ref 0–5)

## 2025-03-18 NOTE — RESULT ENCOUNTER NOTE
Please inform patient that renal function is stable at creatinine 1.09 mg/dL.  Electrolytes are stable, urine test showing persistent protein in the urine which is relatively stable.  Continue current treatment.  He is already on candesartan to help with urine protein  - please order for BMP and UPC ratio to be done before the next office visit in September with Debi

## 2025-03-19 NOTE — TELEPHONE ENCOUNTER
I spoke to Sae he had no questions at this time. He is aware now labs in epic prior to September follow up .         ----- Message from Alek De Guzman MD sent at 3/18/2025  3:26 PM EDT -----  Please inform patient that renal function is stable at creatinine 1.09 mg/dL.  Electrolytes are stable, urine test showing persistent protein in the urine which is relatively stable.  Continue current treatment.  He is already on candesartan to help with urine protein  - please order for BMP and UPC ratio to be done before the next office visit in September with Debi

## 2025-06-05 ENCOUNTER — TELEPHONE (OUTPATIENT)
Dept: NEPHROLOGY | Facility: CLINIC | Age: 60
End: 2025-06-05

## 2025-06-05 ENCOUNTER — TELEPHONE (OUTPATIENT)
Age: 60
End: 2025-06-05

## 2025-06-05 DIAGNOSIS — R80.1 PERSISTENT PROTEINURIA, UNSPECIFIED: Primary | ICD-10-CM

## 2025-06-05 DIAGNOSIS — R60.0 PERIPHERAL EDEMA: ICD-10-CM

## 2025-06-05 DIAGNOSIS — N18.2 CHRONIC KIDNEY DISEASE, STAGE II (MILD): ICD-10-CM

## 2025-06-05 RX ORDER — FUROSEMIDE 20 MG/1
40 TABLET ORAL DAILY
Start: 2025-06-05

## 2025-06-05 NOTE — TELEPHONE ENCOUNTER
Patient called asking if he can put neosporin on his legs. Informed him that's ok, and to also use a non stick gauze if he is wrapping them. He expressed understanding.  No further action needed

## 2025-06-05 NOTE — TELEPHONE ENCOUNTER
Patient asking for labs to be sent to [facility]  labs faxed to [facility]

## 2025-06-05 NOTE — TELEPHONE ENCOUNTER
Spoke with Sae regarding your upcoming appointment on 9/4/2025 with CHER York in [place].      Unfortunately, due to a change in the provider's schedule we need to change your appointment.    I was able to reschedule in [place] with Katt OSORIO Wednesday, 6/11 10 am.

## 2025-06-06 NOTE — ASSESSMENT & PLAN NOTE
- Proteinuria has been progressively getting worse  - Current ACR: 1.5g with current labs - noted very slight increase since March 2025 which was 1.4g  - On Candesartan 16mg orally daily - will increase to 32mg PO daily for ongoing proteinuria  - Encouraged compliance with medications   - Follow up with labs and urine for next appointment

## 2025-06-06 NOTE — ASSESSMENT & PLAN NOTE
Lab Results   Component Value Date    HGBA1C 7.4 (H) 03/17/2025    HGBA1C 7.4 (H) 03/17/2025   - 7.4 as of 3/17/25.   - Noted improvement since 2022 - 11.9  - On insulin - anywhere 40-44 units nightly.   - Low Carb Diet. Will sometimes count carbs. Per patient is aware of them.   - Follows endocrine outpatient - upcoming appointment at the end of this month

## 2025-06-06 NOTE — ASSESSMENT & PLAN NOTE
- Current level: no recent level noted  - On Cholecalciferol 1,000 units  - Order placed for new script, if not covered under insurance will obtain OTC and continue taking  Orders:    Cholecalciferol (VITAMIN D3) 1,000 units tablet; Take 1 tablet (1,000 Units total) by mouth daily    Vitamin D 25 hydroxy; Future

## 2025-06-06 NOTE — ASSESSMENT & PLAN NOTE
- Follows with Dr. De Guzman  - Would prefer to follow in [place] office as it is closer for him  - Current Cr: 1.05 - stable  - Baseline Cr: 1.1-1.2  - On Candesartan daily  - Patient states he usually misses medications because he forgets to take them.   - Previously noted to be prescribed Motrin - Will take once every couple months.   - Goal A1C < 7   - Recommend strict blood pressure control and glycemic control to prevent progression of CKD  Orders:    candesartan (ATACAND) 16 mg tablet; Take 2 tablets (32 mg total) by mouth in the morning.    Basic metabolic panel; Future    Albumin / creatinine urine ratio; Future

## 2025-06-06 NOTE — PROGRESS NOTES
Name: Jim Marcos Jr.      : 1965      MRN: [id number]  Encounter Provider: CHER Herbert  Encounter Date: 2025   Encounter department: [facility] [place]  :  Assessment & Plan  Chronic kidney disease, stage II (mild)  - Follows with Dr. De Guzman  - Would prefer to follow in [place] office as it is closer for him  - Current Cr: 1.05 - stable  - Baseline Cr: 1.1-1.2  - On Candesartan daily  - Patient states he usually misses medications because he forgets to take them.   - Previously noted to be prescribed Motrin - Will take once every couple months.   - Goal A1C < 7   - Recommend strict blood pressure control and glycemic control to prevent progression of CKD  Orders:    candesartan (ATACAND) 16 mg tablet; Take 2 tablets (32 mg total) by mouth in the morning.    Basic metabolic panel; Future    Albumin / creatinine urine ratio; Future    Persistent proteinuria, unspecified  - Proteinuria has been progressively getting worse  - Current ACR: 1.5g with current labs - noted very slight increase since 2025 which was 1.4g  - On Candesartan 16mg orally daily - will increase to 32mg PO daily for ongoing proteinuria  - Encouraged compliance with medications   - Follow up with labs and urine for next appointment       Type 2 diabetes mellitus with proteinuria  (HCC)    Lab Results   Component Value Date    HGBA1C 7.4 (H) 2025    HGBA1C 7.4 (H) 2025   - 7.4 as of 3/17/25.   - Noted improvement since  - 11.9  - On insulin - anywhere 40-44 units nightly.   - Low Carb Diet. Will sometimes count carbs. Per patient is aware of them.   - Follows endocrine outpatient - upcoming appointment at the end of this month         Essential hypertension  - Current bp: 178/100 - recheck /98  - Volume status: slightly hypervolemic - trace edema on lower extremities  - on amlodipine 10mg PO daily, candesartan 16mg daily , Lasix 40mg daily (has been taking  it PRN per patient - last taken this morning)  - Check BP at home: Yes, at least once a day.   - Encouraged patient to do blood pressure log for 7 days for twice a day. Submit BP log to office  - Goal < 130/80  - Low sodium diet < 2g/day  - Healthy lifestyle, diet, exercise.   - Compliance with medications        Vitamin D deficiency  - Current level: no recent level noted  - On Cholecalciferol 1,000 units  - Order placed for new script, if not covered under insurance will obtain OTC and continue taking  Orders:    Cholecalciferol (VITAMIN D3) 1,000 units tablet; Take 1 tablet (1,000 Units total) by mouth daily    Vitamin D 25 hydroxy; Future    Longstanding persistent atrial fibrillation (HCC)  - Seeing Cardiology outpatient with [facility]  - Cardioversion April 2024   - On Metoprolol        Morbid obesity (HCC)  - Encouraged lifestyle changes, healthy diet, and weight loss.            History of Present Illness   Patient seen in office today. Patient c/o generalized hip and knee pain. States he rarely takes NSAIDS, if he does it is every couple of months. Encouraged use of Tylenol for pain and discomfort. Patient also says he always feels tired. Pt says he sleeps during the day and is awake all night. No recent hospitalizations or recent illnesses per the patient. Pt did state he is not always compliant with medications because he will forget to take them daily. Pt also stated he eats foods with higher sodium content (ex lunch meat).  Discussed and educated on a low-sodium diet less than 2 g every day.  Encourage patient to try to stay away from frozen foods, canned foods, going out to eat, Chinese food, lunch meats. Discussed healthy lifestyle, diet and exercise. Per the patient enjoys walking but gets pain in his hips and knees while walking.     During the time of his visit, trace edema noted on B/L lower extremities.  Patient prescribed Lasix 40 mg daily per medication management, however, patient has been taking  medication on an as-needed basis.  He misunderstood the doctor from previous visit. Last dose taken this morning.  Instructed patient to take medication daily. Continue weighing self while at home. Again, low sodium diet with adequate hydration. Pt denies SOB and CP at this time. Per the patient, he stills smoke cigarettes and marijuana on occasion. Educated the importance of quitting and how both can can affect blood pressure, kidneys, ect.     Patient does check his bp at home about one time a day.  Blood pressure elevated while in office today.  Possibly combination of noncompliance with medications, high sodium diet, smoking. Instructed patient to check blood pressure at home twice a day as well as putting results into a blood pressure log.  Log blood pressures for a week.  Please submit to office. Also instructed patient to bring blood pressure cuff to next office visit to correlate his home blood pressure to manual blood pressure readings. Encouraged patient to get up from laying position to standing slowly to avoid feeling dizzy and falls.     Will have patient brought back into office in 3-4 months with labs and urine prior. Understands to contact office with any questions.                 Jim Cortezlakhwinder Ojeda is a 60 y.o. male who presents [facility] office for follow up. Follows Dr. De Guzman. Last seen in office 7/24/24. PMH: CKDII, proteinuria, hypertension, Hypernatremia, DM2, Vitamin D deficiency, obesity.  History obtained from: patient    Review of Systems   Constitutional:  Positive for fatigue. Negative for appetite change, chills and fever.   Respiratory:  Negative for cough and shortness of breath.    Cardiovascular:  Positive for leg swelling. Negative for chest pain and palpitations.   Gastrointestinal:  Positive for constipation. Negative for abdominal pain, diarrhea, nausea and vomiting.        Per patient has history of constipation and diarrhea.    Genitourinary:   Negative for flank pain and hematuria.        Pt sometimes has issues with voiding when constipated    + frothy urine and smell   Musculoskeletal:  Negative for back pain and neck pain.        Hip and knee pain with walking   Neurological:  Positive for dizziness and light-headedness. Negative for headaches.        With positional changes - getting off the chair     Medications Ordered Prior to Encounter[1]      Objective   There were no vitals taken for this visit.     Physical Exam  Vitals reviewed.   Constitutional:       General: He is not in acute distress.     Appearance: He is obese.     Cardiovascular:      Rate and Rhythm: Normal rate.   Pulmonary:      Effort: Pulmonary effort is normal. No respiratory distress.      Breath sounds: Normal breath sounds. No wheezing or rales.   Abdominal:      General: There is no distension.      Palpations: Abdomen is soft.      Tenderness: There is no abdominal tenderness.     Musculoskeletal:      Right lower leg: Edema present.      Left lower leg: Edema present.      Comments: Trace edema     Skin:     General: Skin is warm and dry.      Capillary Refill: Capillary refill takes less than 2 seconds.     Neurological:      Mental Status: He is alert and oriented to person, place, and time. Mental status is at baseline.     Psychiatric:         Mood and Affect: Mood normal.         Behavior: Behavior normal.         Administrative Statements   I have spent a total time of 30 minutes in caring for this patient on the day of the visit/encounter including Instructions for management and Patient and family education.       [1]   Current Outpatient Medications on File Prior to Visit   Medication Sig Dispense Refill    amLODIPine (NORVASC) 10 mg tablet Take 10 mg by mouth in the morning.      apixaban (ELIQUIS) 5 mg Take 5 mg by mouth in the morning and 5 mg in the evening.      candesartan (ATACAND) 16 mg tablet Take 32 mg by mouth in the morning.      clonazePAM (KlonoPIN) 1  mg tablet Take 1 mg by mouth daily as needed      fenofibrate micronized (LOFIBRA) 134 MG capsule Take 134 mg by mouth      Flaxseed, Linseed, (FLAXSEED OIL PO) Take by mouth in the morning      furosemide (LASIX) 20 mg tablet Take 2 tablets (40 mg total) by mouth daily      Insulin Aspart FlexPen (NOVOLOG FLEXPEN) 100 UNIT/ML SOPN       Insulin Degludec (Tresiba) 100 UNIT/ML SOLN Inject 44 Units under the skin Sliding scale      levothyroxine 50 mcg tablet Take 50 mcg by mouth in the morning.      metoprolol tartrate (LOPRESSOR) 25 mg tablet Take 25 mg by mouth in the morning and 25 mg in the evening.      atorvastatin (LIPITOR) 40 mg tablet Take 40 mg by mouth daily (Patient not taking: Reported on 7/24/2024)      baclofen 10 mg tablet Take 1 tablet by mouth 3 (three) times a day as needed for muscle spasms for up to 30 days (Patient not taking: Reported on 7/24/2024) 21 tablet 0    ibuprofen (MOTRIN) 800 mg tablet Take 800 mg by mouth every 8 (eight) hours as needed (Patient not taking: Reported on 7/24/2024)      insulin detemir (LEVEMIR FLEXTOUCH) 100 Units/mL injection pen Inject 40 Units under the skin 2 (two) times a day      Multiple Vitamin (MULTIVITAMIN ADULT PO) Take by mouth daily      Vitamin D, Cholecalciferol, 25 MCG (1000 UT) TABS Take by mouth       No current facility-administered medications on file prior to visit.

## 2025-06-10 LAB
ALBUMIN MFR UR ELPH: 74.2 %
ALBUMIN SERPL ELPH-MCNC: 3.5 G/DL (ref 4.1–5.1)
ALBUMIN/CREAT UR: 1550.4
ALBUMIN/GLOB SERPL: 1.2 {RATIO}
ALPHA1 GLOB ?TM MFR UR ELPH: 8.9 %
ALPHA1 GLOB SERPL ELPH-MCNC: 0.3 G/DL (ref 0.1–0.2)
ALPHA2 GLOB MFR UR ELPH: 3.7 %
ALPHA2 GLOB SERPL ELPH-MCNC: 0.8 G/DL (ref 0.6–0.9)
AMORPH URATE CRY URNS QL MICRO: PRESENT
ANION GAP SERPL CALCULATED.3IONS-SCNC: 6 MMOL/L (ref 3–11)
B-GLOBULIN MFR UR ELPH: 6.2 %
B-GLOBULIN SERPL ELPH-MCNC: 0.8 G/DL (ref 0.6–1)
BUN SERPL-MCNC: 17 MG/DL (ref 7–28)
CALCIUM SERPL-MCNC: 9.4 MG/DL (ref 8.5–10.5)
CHLORIDE SERPL-SCNC: 103 MMOL/L (ref 100–109)
CO2 SERPL-SCNC: 36 MMOL/L (ref 21–31)
CREAT SERPL-MCNC: 1.05 MG/DL (ref 0.53–1.3)
CREAT UR-MCNC: 45.6 MG/DL (ref 50–200)
CREAT UR-MCNC: 46 MG/DL (ref 50–200)
CYTOLOGY CMNT CVX/VAG CYTO-IMP: ABNORMAL
GAMMA GLOB MFR UR ELPH: 7 %
GAMMA GLOB SERPL ELPH-MCNC: 1 G/DL (ref 0.5–1.2)
GFR/BSA.PRED SERPLBLD CYS-BASED-ARV: 82 ML/MIN/{1.73_M2}
GLUCOSE SERPL-MCNC: 74 MG/DL (ref 65–99)
GLUCOSE UR QL STRIP: NEGATIVE MG/DL
HGB UR QL STRIP: 0.03 MG/DL
INTERPRETATION SERPL IFE-IMP: NORMAL
KAPPA LC FREE SER-MCNC: 33.46 MG/L (ref 3.3–19.4)
KAPPA LC FREE/LAMBDA FREE SER: 1.05 {RATIO} (ref 0.26–1.65)
KETONES UR QL STRIP: NEGATIVE MG/DL
LAMBDA LC FREE SERPL-MCNC: 31.77 MG/L (ref 5.7–26.3)
LEUKOCYTE ESTERASE UR QL STRIP: NEGATIVE /UL
MICROALBUMIN UR-MCNC: 70.7 MG/DL
NITRITE UR QL STRIP: NEGATIVE
PH UR: 7 [PH] (ref 4.5–8)
POTASSIUM SERPL-SCNC: 4.1 MMOL/L (ref 3.5–5.2)
PROT 24H UR-MRATE: 100 MG/DL
PROT PATTERN SERPL ELPH-IMP: ABNORMAL
PROT PATTERN UR ELPH-IMP: ABNORMAL
PROT SERPL-MCNC: 6.5 G/DL (ref 6.3–8.3)
PROT UR-MCNC: 97.8 MG/DL
PROT UR-MCNC: 98.8 MG/DL
PROT/CREAT UR: 2.13
RBC #/AREA URNS HPF: 0 /HPF (ref 0–2)
SL AMB POCT URINE COMMENT: ABNORMAL
SL AMB PROTEIN ELECTROPHORESIS, S: ABNORMAL
SODIUM SERPL-SCNC: 145 MMOL/L (ref 135–145)
SP GR UR: 1.01 (ref 1–1.03)
SQUAMOUS #/AREA URNS HPF: 6 /LPF (ref 0–5)
WBC #/AREA URNS HPF: 0 /HPF (ref 0–5)

## 2025-06-10 NOTE — TELEPHONE ENCOUNTER
Veronica from [facility] called to ask which labs the patient needs done today. Went through all of the labs for her.  No further action needed

## 2025-06-11 ENCOUNTER — OFFICE VISIT (OUTPATIENT)
Dept: NEPHROLOGY | Facility: CLINIC | Age: 60
End: 2025-06-11
Payer: MEDICARE

## 2025-06-11 VITALS
WEIGHT: 287 LBS | HEIGHT: 74 IN | DIASTOLIC BLOOD PRESSURE: 100 MMHG | BODY MASS INDEX: 36.83 KG/M2 | SYSTOLIC BLOOD PRESSURE: 178 MMHG

## 2025-06-11 DIAGNOSIS — I10 ESSENTIAL HYPERTENSION: ICD-10-CM

## 2025-06-11 DIAGNOSIS — I48.11 LONGSTANDING PERSISTENT ATRIAL FIBRILLATION (HCC): ICD-10-CM

## 2025-06-11 DIAGNOSIS — R80.9 TYPE 2 DIABETES MELLITUS WITH PROTEINURIA  (HCC): ICD-10-CM

## 2025-06-11 DIAGNOSIS — E87.0 HYPERNATREMIA: ICD-10-CM

## 2025-06-11 DIAGNOSIS — R80.1 PERSISTENT PROTEINURIA, UNSPECIFIED: ICD-10-CM

## 2025-06-11 DIAGNOSIS — E11.29 TYPE 2 DIABETES MELLITUS WITH PROTEINURIA  (HCC): ICD-10-CM

## 2025-06-11 DIAGNOSIS — N18.2 CHRONIC KIDNEY DISEASE, STAGE II (MILD): Primary | ICD-10-CM

## 2025-06-11 DIAGNOSIS — E55.9 VITAMIN D DEFICIENCY: ICD-10-CM

## 2025-06-11 DIAGNOSIS — E66.01 MORBID OBESITY (HCC): ICD-10-CM

## 2025-06-11 PROCEDURE — 99213 OFFICE O/P EST LOW 20 MIN: CPT

## 2025-06-11 RX ORDER — HYDRALAZINE HYDROCHLORIDE 10 MG/1
10 TABLET, FILM COATED ORAL 3 TIMES DAILY
Qty: 90 TABLET | Refills: 1 | Status: CANCELLED | OUTPATIENT
Start: 2025-06-11

## 2025-06-11 RX ORDER — INSULIN DEGLUDEC 100 U/ML
44 INJECTION, SOLUTION SUBCUTANEOUS
COMMUNITY

## 2025-06-11 RX ORDER — CANDESARTAN 16 MG/1
32 TABLET ORAL DAILY
Qty: 90 TABLET | Refills: 1 | Status: SHIPPED | OUTPATIENT
Start: 2025-06-11

## 2025-08-05 ENCOUNTER — NURSE TRIAGE (OUTPATIENT)
Age: 60
End: 2025-08-05

## 2025-08-05 DIAGNOSIS — R60.0 PERIPHERAL EDEMA: ICD-10-CM

## 2025-08-05 DIAGNOSIS — N18.2 CHRONIC KIDNEY DISEASE, STAGE II (MILD): Primary | ICD-10-CM

## 2025-08-06 RX ORDER — FUROSEMIDE 40 MG/1
40 TABLET ORAL 2 TIMES DAILY
Start: 2025-08-06

## 2025-08-14 ENCOUNTER — TELEPHONE (OUTPATIENT)
Dept: NEPHROLOGY | Facility: CLINIC | Age: 60
End: 2025-08-14